# Patient Record
(demographics unavailable — no encounter records)

---

## 2024-11-06 NOTE — HISTORY OF PRESENT ILLNESS
[___ yrs] : [unfilled] year(s) ago [Paroxysmal] : paroxysmal [4] : a maximum pain level of 4/10 [Sharp] : sharp [Aching] : aching [Throbbing] : throbbing [Shooting] : shooting [Sitting] : sitting [Walking] : walking [Ice] : ice [0 (No Pain)] : 1. What number best describes your pain on average in the past week? 0/10 pain [0 (Does not interfere)] : 3. What number best describes how, during the past week, pain has interfered with your general activity? 0/10 pain [FreeTextEntry1] : Verbal consent was given by/on: Donn Garcia on 09/20/23  Patient location: Home, [place], [place]  Physician location: Office, [place], [place]  Reason for Telehealth visit: Back pain  Patient reports for follow-up. His pain remains severe on his right low back. His chiropractor did help him somewhat. Quality of life is impaired. There has been a severe exacerbation of the patient's chronic pain. His pain is now severe. He now has new pain in the left side of his back which he has never had before. [facility] has helped him.  This service took place using a two way audio and visual platform. The patient and Dr. hSirley were both able to see each other and communicate through video. There were no barriers to communication. Greater then 50% of the time spent in the encounter involved counseling and coordination of care.   Time spent on visit: 30 minutes  HPI: 38-year-old male presents with low back pain which radiates down his right lower extremity laterally. He has had back pain for over 10 years. He has gone to a chiropractor during this time with some relief. He recently worsened his back pain worse than ever before. He did have cold in March. His quality of life is significantly impaired. No relief with physical therapy. No relief with any medications including anti-inflammatories.  Interventions: Right L4-L5 and L5-S1 (06/28/22): Right L4-L5 and L5-S1 (07/20/21): [FreeTextEntry7] : Right side of lower back  [FreeTextEntry2] : 0

## 2024-11-06 NOTE — ASSESSMENT
[FreeTextEntry1] : 41-year-old male presents with low back pain which radiates down his right lower extremity laterally for follow-up after excellent relief from [facility] but pain has returned.  I have personally reviewed the patient's MRI in detail and discussed it with them which is significant for a slight change in disc herniation at L4-L5 but largely unchanged, however, he now has new symptoms on his left side which he has never had before.  The patient has failed to have relief with over six weeks of physical therapy within the last three months and all medications. GIven their failure to improve with all other conservative measures recommend MRI lumbar spine. Patient will return to review imaging and plan for potential intervention.  The patient has failed to have relief with medication management. The patient has failed to have relief with more then six weeks of physical therapy within the last three months. Given the patients failure to improve with all other conservative measures, recommend right L4-L5 and L5-S1 transforaminal epidural steroid injection under fluoroscopic guidance. The patient will follow-up with me in my office two weeks following intervention.  I have discussed in detail with the patient that an interventional spine procedure is associated with potential risks. The procedure may include an injection of steroid and potentially other medications (local anesthetic and normal saline) into the epidural space or surrounding tissue of the spine. There are significant risks of this procedure which include and are not limited to infection, bleeding, worsening pain, dural puncture leading to post-dural puncture headache, nerve damage, spinal cord injury, paralysis, stroke, and death. There is a chance that the procedure does not improve their pain. There are risks associated with the steroid being absorbed into the body systemically. These include dysphoria, difficulty sleeping, mood swings, and personality changes. Pre-menopausal women may notice a regularity his in her menstrual cycle for 2-3 months following the injection. Steroids can specifically affect patients with hypertension, diabetes, and peptic ulcers. The procedure may cause a temporary increase in blood pressure and blood glucose, and may adversely affect a peptic ulcer. Other, more rare complications, including avascular necrosis of the joints, glaucoma, and osteoporosis. I have discussed the risks of the procedure at length with the patient, and the potential benefits of pain relief. I have offered alternatives to the procedure. All questions were answered. The patient expressed understanding and wishes to proceed with the procedure.  Physical therapy prescribed - goal will be to increase ROM, strengthening, postural training, other modalities ad lakhwinder which may include massage and stim. Goals of therapy discussed with the patient in detail and will be discussed with physical therapist. Patient will follow-up following course of physical therapy to monitor progress and adjust therapy as needed.  Acetaminophen 1,000 mg q8h prn for moderate pain. Risks, benefits, and alternatives of acetaminophen discussed with patient.  Ibuprofen 600 mg q8h prn add when pain is not adequately controlled with acetaminophen. Risks, benefits, and alternatives of ibuprofen discussed with patient.  Diet and nutritional strategies discussed which may improve patients pain and will improve overall health.  Patient is scheduled for procedure based on history, imaging and limited physical exam performed on TeleHealth visit.  If necessary, additional focal physical exam will be performed on date of procedure

## 2024-11-06 NOTE — DATA REVIEWED
[FreeTextEntry1] : MRI Lumbar Spine (05/22/20):\par  \par  L1-L2: Unremarkable\par  L2-L3: Unremarkable\par  L3-L4: Mild central and mild bilateral stenosis foraminally.\par  L4-L5: Right paramedian caudad disc extrusion effacing the thecal sac and right L5 nerve root sheath. Normal spinal canal dimension. Mild bilateral foraminal stenosis. New let paramedian caudal disc extrusion.\par  L5-S1: Posterior annular tear.

## 2024-11-06 NOTE — HISTORY OF PRESENT ILLNESS
[___ yrs] : [unfilled] year(s) ago [Paroxysmal] : paroxysmal [4] : a maximum pain level of 4/10 [Sharp] : sharp [Aching] : aching [Throbbing] : throbbing [Shooting] : shooting [Sitting] : sitting [Walking] : walking [Ice] : ice [0 (No Pain)] : 1. What number best describes your pain on average in the past week? 0/10 pain [0 (Does not interfere)] : 3. What number best describes how, during the past week, pain has interfered with your general activity? 0/10 pain [FreeTextEntry1] : Verbal consent was given by/on: Donn Garcia on 09/20/23  Patient location: Home, [place], [place]  Physician location: Office, [place], [place]  Reason for Telehealth visit: Back pain  Patient reports for follow-up. His pain remains severe on his right low back. His chiropractor did help him somewhat. Quality of life is impaired. There has been a severe exacerbation of the patient's chronic pain. His pain is now severe. He now has new pain in the left side of his back which he has never had before. [facility] has helped him.  This service took place using a two way audio and visual platform. The patient and Dr. Shirley were both able to see each other and communicate through video. There were no barriers to communication. Greater then 50% of the time spent in the encounter involved counseling and coordination of care.   Time spent on visit: 30 minutes  HPI: 38-year-old male presents with low back pain which radiates down his right lower extremity laterally. He has had back pain for over 10 years. He has gone to a chiropractor during this time with some relief. He recently worsened his back pain worse than ever before. He did have cold in March. His quality of life is significantly impaired. No relief with physical therapy. No relief with any medications including anti-inflammatories.  Interventions: Right L4-L5 and L5-S1 (06/28/22): Right L4-L5 and L5-S1 (07/20/21): [FreeTextEntry7] : Right side of lower back  [FreeTextEntry2] : 0

## 2024-11-06 NOTE — PHYSICAL EXAM
[de-identified] : Constitutional: Well-developed, in no acute distress\par  \par  Psychiatric: Appropriate mood and affect, oriented to time, place, person, and situation

## 2024-11-06 NOTE — PHYSICAL EXAM
[de-identified] : Constitutional: Well-developed, in no acute distress\par  \par  Psychiatric: Appropriate mood and affect, oriented to time, place, person, and situation